# Patient Record
Sex: FEMALE | Race: WHITE | NOT HISPANIC OR LATINO | Employment: OTHER | URBAN - METROPOLITAN AREA
[De-identification: names, ages, dates, MRNs, and addresses within clinical notes are randomized per-mention and may not be internally consistent; named-entity substitution may affect disease eponyms.]

---

## 2012-06-04 LAB — PAP-ABSTRACT: NORMAL

## 2013-04-23 LAB — PAP-ABSTRACT: NORMAL

## 2014-12-08 LAB — PAP-ABSTRACT: NORMAL

## 2017-12-04 ENCOUNTER — OFFICE VISIT (OUTPATIENT)
Dept: OBGYN | Facility: CLINIC | Age: 67
End: 2017-12-04
Payer: COMMERCIAL

## 2017-12-04 ENCOUNTER — RADIANT APPOINTMENT (OUTPATIENT)
Dept: MAMMOGRAPHY | Facility: CLINIC | Age: 67
End: 2017-12-04
Payer: COMMERCIAL

## 2017-12-04 VITALS
DIASTOLIC BLOOD PRESSURE: 62 MMHG | BODY MASS INDEX: 24.66 KG/M2 | SYSTOLIC BLOOD PRESSURE: 112 MMHG | WEIGHT: 134 LBS | HEIGHT: 62 IN

## 2017-12-04 DIAGNOSIS — Z01.419 ENCOUNTER FOR GYNECOLOGICAL EXAMINATION WITHOUT ABNORMAL FINDING: Primary | ICD-10-CM

## 2017-12-04 DIAGNOSIS — Z12.31 VISIT FOR SCREENING MAMMOGRAM: ICD-10-CM

## 2017-12-04 DIAGNOSIS — N95.2 VAGINITIS, ATROPHIC: ICD-10-CM

## 2017-12-04 PROCEDURE — G0202 SCR MAMMO BI INCL CAD: HCPCS | Mod: TC

## 2017-12-04 PROCEDURE — 77063 BREAST TOMOSYNTHESIS BI: CPT | Mod: TC

## 2017-12-04 PROCEDURE — G0476 HPV COMBO ASSAY CA SCREEN: HCPCS | Performed by: OBSTETRICS & GYNECOLOGY

## 2017-12-04 PROCEDURE — 99397 PER PM REEVAL EST PAT 65+ YR: CPT | Performed by: OBSTETRICS & GYNECOLOGY

## 2017-12-04 PROCEDURE — G0145 SCR C/V CYTO,THINLAYER,RESCR: HCPCS | Performed by: OBSTETRICS & GYNECOLOGY

## 2017-12-04 RX ORDER — CONJUGATED ESTROGENS 0.62 MG/G
0.5 CREAM VAGINAL
Qty: 30 G | Refills: 3 | Status: SHIPPED | OUTPATIENT
Start: 2017-12-04 | End: 2018-02-19

## 2017-12-04 ASSESSMENT — ANXIETY QUESTIONNAIRES
1. FEELING NERVOUS, ANXIOUS, OR ON EDGE: NOT AT ALL
IF YOU CHECKED OFF ANY PROBLEMS ON THIS QUESTIONNAIRE, HOW DIFFICULT HAVE THESE PROBLEMS MADE IT FOR YOU TO DO YOUR WORK, TAKE CARE OF THINGS AT HOME, OR GET ALONG WITH OTHER PEOPLE: NOT DIFFICULT AT ALL
3. WORRYING TOO MUCH ABOUT DIFFERENT THINGS: NOT AT ALL
7. FEELING AFRAID AS IF SOMETHING AWFUL MIGHT HAPPEN: NOT AT ALL
6. BECOMING EASILY ANNOYED OR IRRITABLE: NOT AT ALL
2. NOT BEING ABLE TO STOP OR CONTROL WORRYING: NOT AT ALL
5. BEING SO RESTLESS THAT IT IS HARD TO SIT STILL: NOT AT ALL
GAD7 TOTAL SCORE: 0

## 2017-12-04 ASSESSMENT — PATIENT HEALTH QUESTIONNAIRE - PHQ9
5. POOR APPETITE OR OVEREATING: NOT AT ALL
SUM OF ALL RESPONSES TO PHQ QUESTIONS 1-9: 0

## 2017-12-04 NOTE — PROGRESS NOTES
Maribel is a 67 year old No obstetric history on file. female who presents for annual exam.     Besides routine health maintenance,  she would like to discuss premarin cream.    Do you have a Health Care Directive?: Yes: Patient states has Advance Directive and will bring in a copy to clinic.    Fall risk:   Fall Risk Assessment completed per order.    HPI: The patient is seen at this time for her annual examination. She uses Premarin cream for vaginal atrophy and has had no side effects. Her maintenance is 2 times a week.  The patient's PCP is Chelsea Saini MD.        GYNECOLOGIC HISTORY:  No LMP recorded. Patient is postmenopausal..   reports that she has never smoked. She does not have any smokeless tobacco history on file.      Patient is sexually active.  STD testing offered?  Declined  Last PHQ-9 score on record= PHQ-9 SCORE 11/30/2016   Total Score 0     Last GAD7 score on record=   CHENG-7 SCORE 11/30/2016   Total Score 0     Alcohol Score = 4    HEALTH MAINTENANCE:  Cholesterol: (No results found for: CHOL   Last Mammo: one year ago, Result: normal, Next Mammo: today   Pap:   Lab Results   Component Value Date    PAP NIL 11/30/2016    PAP NIL 12/23/2015 11/30/2016 WNL   DEXA:  11/30/16  Colonoscopy:  <5 years ago, Result:  normal, Next Colonoscopy: repeats them every 5 years.    Health maintenance updated:  yes    HISTORY:  Obstetric History     No data available        There is no problem list on file for this patient.    Past Surgical History:   Procedure Laterality Date     AS ENDOMETRIAL BIOPSY W/O CERVICAL DILATION  2000     BACK SURGERY  1997    Lumbar discs     COLONOSCOPY  2014     DILATION AND CURETTAGE, OPERATIVE HYSTEROSCOPY, COMBINED  2002      Social History   Substance Use Topics     Smoking status: Never Smoker     Smokeless tobacco: Not on file     Alcohol use Yes      Problem (# of Occurrences) Relation (Name,Age of Onset)    Other Cancer (2) Mother: ovarian, Paternal Aunt: uterine             Current Outpatient Prescriptions   Medication Sig     lisinopril (PRINIVIL/ZESTRIL) 40 MG tablet Take 40 mg by mouth daily     PREMARIN cream Place 0.5 g vaginally twice a week     conjugated estrogens (PREMARIN) cream Place 0.5 g vaginally twice a week     simvastatin (ZOCOR) 20 MG tablet      No current facility-administered medications for this visit.        No Known Allergies    Past medical, surgical, social and family history were reviewed and updated in EPIC.    ROS:   12 point review of systems negative other than symptoms noted below.    EXAM:  There were no vitals taken for this visit.   BMI: There is no height or weight on file to calculate BMI.    EXAM:  Constitutional: Appearance: Well nourished, well developed alert, in no acute distress  Neck:  Lymph Nodes:  No lymphadenopathy present    Thyroid:  Gland size normal, nontender, no nodules or masses present  on palpation  Chest:  Respiratory Effort:  Breathing unlabored  Cardiovascular:Heart    Auscultation:  Regular rate, normal rhythm, no murmurs present  Breasts: Inspection of Breasts:  No lymphadenopathy present., Palpation of Breasts and Axillae:  No masses present on palpation, no breast tenderness., Axillary Lymph Nodes:  No lymphadenopathy present. and No nodularity, asymmetry or nipple discharge bilaterally.  Gastrointestinal:  Abdominal Examination:  Abdomen nontender to palpation, tone normal without     rigidity or guarding, no masses present, umbilicus without lesions    Liver and speen:  No hepatomegaly present, liver nontender to palpation    Hernias:  No hernias present  Lymphatic: Lymph Nodes:  No other lymphadenopathy present  Skin:  General Inspection:  No rashes present, no lesions present, no areas of  discoloration.    Genitalia and Groin:  No rashes present, no lesions present, no areas of  discoloration, no masses present  Neurologic/Psychiatric:    Mental Status:  Oriented X3     Pelvic Exam:  External Genitalia:     Normal  appearance for age, no discharge present, no tenderness present, no inflammatory lesions present, color normal  Vagina:     Normal vaginal vault without central or paravaginal defects, no discharge present, no inflammatory lesions present, no masses present  Bladder:     Nontender to palpation  Urethra:   Urethral Body:  Urethra palpation normal, urethra structural support normal   Urethral Meatus:  No erythema or lesions present  Cervix:     Appearance healthy, no lesions present, nontender to palpation, no bleeding present  Uterus:     Uterus: firm, normal sized and nontender, midplane in position.   Adnexa:     No adnexal tenderness present, no adnexal masses present  Perineum:     Perineum within normal limits, no evidence of trauma, no rashes or skin lesions present  Anus:     Anus within normal limits, no hemorrhoids present  Inguinal Lymph Nodes:     No lymphadenopathy present  Pubic Hair:     Normal pubic hair distribution for age  Genitalia and Groin:     No rashes present, no lesions present, no areas of discoloration, no masses present      COUNSELING:   Reviewed preventive health counseling, as reflected in patient instructions       Regular exercise       Healthy diet/nutrition    BMI:  There is no height or weight on file to calculate BMI.     reports that she has never smoked. She does not have any smokeless tobacco history on file.        ASSESSMENT:  67 year old female with satisfactory annual exam.    PLAN: The patient will continue to use her Premarin cream that we will refill. We will convey her results and asked her to stay with her yearly preventative exams.      Cody Cochran MD

## 2017-12-04 NOTE — LETTER
December 12, 2017      Maribel Aguilar  [address] [facility] [address]    Dear ,      I am happy to inform you that your cervical cancer screening test (PAP smear) was normal and your Human Papillomavirus (HPV) test was negative.    Per current guidelines, you no longer need to have pap smears completed. Please return for annual pelvic exams.    Please continue to be seen every year for an annual wellness visit and other preventative tests.     Please contact my office at [phone] if you have further questions.    Sincerely,      Cody Cochran MD/keke

## 2017-12-04 NOTE — MR AVS SNAPSHOT
"              After Visit Summary   2017    Maribel Aguilar    MRN: [id number]           Patient Information     Date Of Birth          1950        Visit Information        Provider Department      2017 10:45 AM Cody Cochran MD [facility]        Today's Diagnoses     Encounter for gynecological examination without abnormal finding    -  1    Vaginitis, atrophic           Follow-ups after your visit        Who to contact     If you have questions or need follow up information about today's clinic visit or your schedule please contact [facility] directly at [phone].  Normal or non-critical lab and imaging results will be communicated to you by MyChart, letter or phone within 4 business days after the clinic has received the results. If you do not hear from us within 7 days, please contact the clinic through [employer] or phone. If you have a critical or abnormal lab result, we will notify you by phone as soon as possible.  Submit refill requests through [employer] or call your pharmacy and they will forward the refill request to us. Please allow 3 business days for your refill to be completed.          Additional Information About Your Visit        MyChart Information     [employer] lets you send messages to your doctor, view your test results, renew your prescriptions, schedule appointments and more. To sign up, go to [email or web address] . Click on \"Log in\" on the left side of the screen, which will take you to the Welcome page. Then click on \"Sign up Now\" on the right side of the page.     You will be asked to enter the access code listed below, as well as some personal information. Please follow the directions to create your username and password.     Your access code is: [email or web address]  Expires: 3/4/2018 11:40 AM     Your access code will  in 90 days. If you need help or a new code, please call your [place] clinic or [phone].        Care " "EveryWhere ID     This is your Care EveryWhere ID. This could be used by other organizations to access your [place] medical records  [id number]        Your Vitals Were     Height Breastfeeding? BMI (Body Mass Index)             5' 2\" (1.575 m) No 24.51 kg/m2          Blood Pressure from Last 3 Encounters:   12/04/17 112/62   11/30/16 124/60   12/23/15 134/60    Weight from Last 3 Encounters:   12/04/17 134 lb (60.8 kg)   11/30/16 134 lb 12.8 oz (61.1 kg)   12/23/15 131 lb (59.4 kg)              We Performed the Following     HPV High Risk Types DNA Cervical     Pap imaged thin layer screen with HPV - recommended age 30 - 65          Where to get your medicines      These medications were sent to [facility] [id number], [place] [address] [address] [facility]  [address]  [facility] [address]     Phone:  [phone]     PREMARIN cream          Primary Care Provider Office Phone # Fax #    Chelsea Saini [phone]       [facility] [address]  [facility] [address]        Equal Access to Services     ABDI LUKE AH: Hadii aad ku hadasho Soomaali, waaxda luqadaha, qaybta kaalmada adeegyada, waxay idiin hayradhan jenniffer morgan lage han. So [facility] [phone].    ATENCIÓN: Si habla español, tiene a hudson disposición servicios gratuitos de asistencia lingüística. Llame al [phone].    We comply with applicable federal civil rights laws and [place] laws. We do not discriminate on the basis of race, color, national origin, age, disability, sex, sexual orientation, or gender identity.            Thank you!     Thank you for choosing [facility] [address]  for your care. Our goal is always to provide you with excellent care. Hearing back from our patients is one way we can continue to improve our services. Please take a few minutes to complete the written survey that you may receive in the mail after your visit with us. Thank you!             Your " Updated Medication List - Protect others around you: Learn how to safely use, store and throw away your medicines at www.disposemymeds.org.          This list is accurate as of: 12/4/17 11:40 AM.  Always use your most recent med list.                   Brand Name Dispense Instructions for use Diagnosis    lisinopril 40 MG tablet    PRINIVIL/ZESTRIL     Take 40 mg by mouth daily        PREMARIN cream   Generic drug:  conjugated estrogens     30 g    Place 0.5 g vaginally twice a week    Vaginitis, atrophic       simvastatin 20 MG tablet    ZOCOR

## 2017-12-05 ENCOUNTER — TELEPHONE (OUTPATIENT)
Dept: OBGYN | Facility: CLINIC | Age: 67
End: 2017-12-05

## 2017-12-05 ASSESSMENT — ANXIETY QUESTIONNAIRES: GAD7 TOTAL SCORE: 0

## 2017-12-05 NOTE — TELEPHONE ENCOUNTER
rec'd rejection for premarin vaginal cream. LM on PHI for her to check with insurance on form alts, or she can continue to pay cash as she has been per notes in chart.

## 2017-12-06 LAB
COPATH REPORT: NORMAL
PAP: NORMAL

## 2017-12-06 NOTE — TELEPHONE ENCOUNTER
Reason for Call:  Other call back    Detailed comments: Patient wants to tell Leslie that she has decided to stick with using the Premarin Creme for now. But, she is not going to fill it until she needs it. So she hopes it is not going to be  a problem for her to refill when she wants to.    Phone Number Patient can be reached at: Cell number on file:    Telephone Information:   Mobile [phone]       Best Time: today    Can we leave a detailed message on this number? YES    Call taken on 12/6/2017 at 3:49 PM by Ginger Burt

## 2017-12-08 LAB
FINAL DIAGNOSIS: NORMAL
HPV HR 12 DNA CVX QL NAA+PROBE: NEGATIVE
HPV16 DNA SPEC QL NAA+PROBE: NEGATIVE
HPV18 DNA SPEC QL NAA+PROBE: NEGATIVE
SPECIMEN DESCRIPTION: NORMAL

## 2017-12-11 PROBLEM — Z12.4 CERVICAL CANCER SCREENING: Status: RESOLVED | Noted: 2017-12-11 | Resolved: 2017-12-11

## 2018-02-19 ENCOUNTER — TELEPHONE (OUTPATIENT)
Dept: OBGYN | Facility: CLINIC | Age: 68
End: 2018-02-19

## 2018-02-19 DIAGNOSIS — N95.2 VAGINITIS, ATROPHIC: ICD-10-CM

## 2018-02-19 RX ORDER — HYDROCORTISONE 2.5 %
CREAM (GRAM) TOPICAL
Qty: 30 G | Refills: 0 | OUTPATIENT
Start: 2018-02-19

## 2018-02-19 NOTE — TELEPHONE ENCOUNTER
[facility] stated they never received the Rx that was sent to them 12/4/17.  Pt would like a paper Rx to send to [facility] SO SHE CAN GET HER CREAM FOR A MORE REASONABLE COST.   Pt states she will come in to pick it up.   Routing to Roxy Jang- ok to refill for pt- she's requesting the paper copy.

## 2018-02-19 NOTE — TELEPHONE ENCOUNTER
Pt saw dr. Cochran in dec and he said he would put in a refill for her premerine cream and when she went to refill it there was no rx there for her.  She also wants to get her medication refill in [place] where it is cheaper and she would like to talk to a nurse before anything is given to her.  She needs a different dosage and she needs a written rx for it.  Pt was told her medication is from [facility] and she wants to know that it is safe.

## 2018-02-20 RX ORDER — CONJUGATED ESTROGENS 0.62 MG/G
1 CREAM VAGINAL
Qty: 42 G | Refills: 3 | Status: SHIPPED | OUTPATIENT
Start: 2018-02-21 | End: 2018-12-06

## 2018-02-20 RX ORDER — CONJUGATED ESTROGENS 0.62 MG/G
1 CREAM VAGINAL
Qty: 30 G | Refills: 3 | Status: SHIPPED | OUTPATIENT
Start: 2018-02-21 | End: 2018-02-20

## 2018-12-06 ENCOUNTER — RADIANT APPOINTMENT (OUTPATIENT)
Dept: MAMMOGRAPHY | Facility: CLINIC | Age: 68
End: 2018-12-06
Payer: COMMERCIAL

## 2018-12-06 ENCOUNTER — OFFICE VISIT (OUTPATIENT)
Dept: OBGYN | Facility: CLINIC | Age: 68
End: 2018-12-06
Payer: COMMERCIAL

## 2018-12-06 ENCOUNTER — RADIANT APPOINTMENT (OUTPATIENT)
Dept: BONE DENSITY | Facility: CLINIC | Age: 68
End: 2018-12-06
Payer: COMMERCIAL

## 2018-12-06 VITALS
WEIGHT: 128.2 LBS | DIASTOLIC BLOOD PRESSURE: 62 MMHG | SYSTOLIC BLOOD PRESSURE: 106 MMHG | HEART RATE: 74 BPM | BODY MASS INDEX: 23.59 KG/M2 | HEIGHT: 62 IN

## 2018-12-06 DIAGNOSIS — Z78.0 ASYMPTOMATIC POSTMENOPAUSAL STATE: ICD-10-CM

## 2018-12-06 DIAGNOSIS — Z01.419 ENCOUNTER FOR GYNECOLOGICAL EXAMINATION WITHOUT ABNORMAL FINDING: Primary | ICD-10-CM

## 2018-12-06 DIAGNOSIS — Z12.31 VISIT FOR SCREENING MAMMOGRAM: ICD-10-CM

## 2018-12-06 DIAGNOSIS — Z13.820 SCREENING FOR OSTEOPOROSIS: Primary | ICD-10-CM

## 2018-12-06 DIAGNOSIS — M85.80 OSTEOPENIA, SENILE: ICD-10-CM

## 2018-12-06 DIAGNOSIS — N95.2 VAGINITIS, ATROPHIC: ICD-10-CM

## 2018-12-06 DIAGNOSIS — M89.9 DISORDER OF BONE: ICD-10-CM

## 2018-12-06 PROCEDURE — G0145 SCR C/V CYTO,THINLAYER,RESCR: HCPCS | Performed by: OBSTETRICS & GYNECOLOGY

## 2018-12-06 PROCEDURE — G0476 HPV COMBO ASSAY CA SCREEN: HCPCS | Performed by: OBSTETRICS & GYNECOLOGY

## 2018-12-06 PROCEDURE — 77067 SCR MAMMO BI INCL CAD: CPT | Mod: TC

## 2018-12-06 PROCEDURE — 77080 DXA BONE DENSITY AXIAL: CPT | Performed by: OBSTETRICS & GYNECOLOGY

## 2018-12-06 PROCEDURE — 77063 BREAST TOMOSYNTHESIS BI: CPT | Mod: TC

## 2018-12-06 PROCEDURE — 99397 PER PM REEVAL EST PAT 65+ YR: CPT | Performed by: OBSTETRICS & GYNECOLOGY

## 2018-12-06 RX ORDER — CONJUGATED ESTROGENS 0.62 MG/G
1 CREAM VAGINAL
Qty: 42 G | Refills: 3 | Status: SHIPPED | OUTPATIENT
Start: 2018-12-07 | End: 2019-12-09

## 2018-12-06 ASSESSMENT — ANXIETY QUESTIONNAIRES
2. NOT BEING ABLE TO STOP OR CONTROL WORRYING: NOT AT ALL
GAD7 TOTAL SCORE: 0
3. WORRYING TOO MUCH ABOUT DIFFERENT THINGS: NOT AT ALL
1. FEELING NERVOUS, ANXIOUS, OR ON EDGE: NOT AT ALL
IF YOU CHECKED OFF ANY PROBLEMS ON THIS QUESTIONNAIRE, HOW DIFFICULT HAVE THESE PROBLEMS MADE IT FOR YOU TO DO YOUR WORK, TAKE CARE OF THINGS AT HOME, OR GET ALONG WITH OTHER PEOPLE: NOT DIFFICULT AT ALL
5. BEING SO RESTLESS THAT IT IS HARD TO SIT STILL: NOT AT ALL
6. BECOMING EASILY ANNOYED OR IRRITABLE: NOT AT ALL
7. FEELING AFRAID AS IF SOMETHING AWFUL MIGHT HAPPEN: NOT AT ALL

## 2018-12-06 ASSESSMENT — PATIENT HEALTH QUESTIONNAIRE - PHQ9
5. POOR APPETITE OR OVEREATING: NOT AT ALL
SUM OF ALL RESPONSES TO PHQ QUESTIONS 1-9: 0

## 2018-12-06 NOTE — PROGRESS NOTES
Maribel is a 68 year old No obstetric history on file. female who presents for annual exam.     Besides routine health maintenance, she has no other health concerns today .    Do you have a Health Care Directive?: Yes: Patient states has Advance Directive and will bring in a copy to clinic.    Fall risk:   Fallen 2 or more times in the past year?: No  Any fall with injury in the past year?: No    HPI: The patient is seen at this time for her annual exam.  She is still using estrogen cream but inconsistently.  She has no current complaints.  The patient's PCP is  Chelsea Saini MD.      GYNECOLOGIC HISTORY:  No LMP recorded. Patient is postmenopausal..   reports that she has never smoked. She has never used smokeless tobacco.    Patient is sexually active.  STD testing offered?  Declined  Last PHQ-9 score on record=   PHQ-9 SCORE 12/6/2018   PHQ-9 Total Score 0     Last GAD7 score on record=   CHENG-7 SCORE 11/30/2016 12/4/2017 12/6/2018   Total Score 0 0 0     Alcohol Score = 4    HEALTH MAINTENANCE:  Cholesterol: 5/1/2017   Total= 184, Triglycerides=80, HDL=64, ZCG=324  Last Mammo: 12/5/2017, Result: normal, Next Mammo: today   Pap: 12/4/2017 Neg, HPV-  Lab Results   Component Value Date    PAP NIL 12/04/2017    PAP NIL 11/30/2016    PAP NIL 12/23/2015      DEXA:  12/5/2017  Colonoscopy:  11/3/2000, Result:  normal, Next Colonoscopy: 10 years.    Health maintenance updated:  yes    HISTORY:  Obstetric History     No data available        Patient Active Problem List   Diagnosis   (none) - all problems resolved or deleted     Past Surgical History:   Procedure Laterality Date     AS ENDOMETRIAL BIOPSY W/O CERVICAL DILATION  2000     BACK SURGERY  1997    Lumbar discs     COLONOSCOPY  2014     DILATION AND CURETTAGE, OPERATIVE HYSTEROSCOPY, COMBINED  2002      Social History   Substance Use Topics     Smoking status: Never Smoker     Smokeless tobacco: Never Used     Alcohol use Yes      Problem (# of Occurrences)  "Relation (Name,Age of Onset)    Other Cancer (2) Mother: ovarian, Paternal Aunt: uterine            Current Outpatient Prescriptions   Medication Sig     lisinopril (PRINIVIL/ZESTRIL) 40 MG tablet Take 40 mg by mouth daily     PREMARIN cream Place 1 g vaginally three times a week     simvastatin (ZOCOR) 20 MG tablet      No current facility-administered medications for this visit.        No Known Allergies    Past medical, surgical, social and family history were reviewed and updated in EPIC.    ROS:   12/5/2017    EXAM:  /62  Pulse 74  Ht 5' 2\" (1.575 m)  Wt 128 lb 3.2 oz (58.2 kg)  Breastfeeding? No  BMI 23.45 kg/m2   BMI: Body mass index is 23.45 kg/(m^2).    EXAM:  Constitutional: Appearance: Well nourished, well developed alert, in no acute distress  Neck:  Lymph Nodes:  No lymphadenopathy present    Thyroid:  Gland size normal, nontender, no nodules or masses present  on palpation  Chest:  Respiratory Effort:  Breathing unlabored  Cardiovascular:Heart    Auscultation:  Regular rate, normal rhythm, no murmurs present  Breasts: Inspection of Breasts:  No lymphadenopathy present., Palpation of Breasts and Axillae:  No masses present on palpation, no breast tenderness., Axillary Lymph Nodes:  No lymphadenopathy present. and No nodularity, asymmetry or nipple discharge bilaterally.  Gastrointestinal:  Abdominal Examination:  Abdomen nontender to palpation, tone normal without     rigidity or guarding, no masses present, umbilicus without lesions    Liver and speen:  No hepatomegaly present, liver nontender to palpation    Hernias:  No hernias present  Lymphatic: Lymph Nodes:  No other lymphadenopathy present  Skin:  General Inspection:  No rashes present, no lesions present, no areas of  discoloration.    Genitalia and Groin:  No rashes present, no lesions present, no areas of  discoloration, no masses present  Neurologic/Psychiatric:    Mental Status:  Oriented X3     Pelvic Exam:  External Genitalia:  "    Normal appearance for age, no discharge present, no tenderness present, no inflammatory lesions present, color normal  Vagina:     Normal vaginal vault without central or paravaginal defects, ATROPHIC  Bladder:     Nontender to palpation  Urethra:   Urethral Body:  Urethra palpation normal, urethra structural support normal   Urethral Meatus:  No erythema or lesions present  Cervix:     Appearance healthy, no lesions present, nontender to palpation, no bleeding present  Uterus:     Nontender to palpation, no masses present, position anteflexed, mobility: normal  Adnexa:     No adnexal tenderness present, no adnexal masses present  Perineum:     Perineum within normal limits, no evidence of trauma, no rashes or skin lesions present  Inguinal Lymph Nodes:     No lymphadenopathy present      COUNSELING:   Reviewed preventive health counseling, as reflected in patient instructions       Regular exercise       Healthy diet/nutrition    BMI:  Body mass index is 23.45 kg/(m^2).     reports that she has never smoked. She has never used smokeless tobacco.      ASSESSMENT:  68 year old female with satisfactory annual exam.    ICD-10-CM    1. Encounter for gynecological examination without abnormal finding Z01.419 Pap imaged thin layer screen with HPV - recommended age 30 - 65     HPV High Risk Types DNA Cervical   2. Vaginitis, atrophic N95.2 PREMARIN 0.625 MG/GM vaginal cream       PLAN: We will convey the patient's screening results when available.  We encouraged her to continue her calcium vitamin D and exercise      Cody Cochran MD

## 2018-12-06 NOTE — LETTER
December 17, 2018      Maribel Aguilar  [address] [facility] [address]    Dear ,      I am happy to inform you that your cervical cancer screening test (PAP smear) was normal and your Human Papillomavirus (HPV) test was negative.    Per current guidelines, you no longer need to have pap smears completed.     Please continue to be seen every year for an annual wellness visit and other preventative tests.     If you have additional questions regarding this result, please call our registered nurse, Betzy at [phone].    Sincerely,      Cody Cochran MD/keke

## 2018-12-06 NOTE — MR AVS SNAPSHOT
"              After Visit Summary   2018    Maribel Aguilar    MRN: [id number]           Patient Information     Date Of Birth          1950        Visit Information        Provider Department      2018 2:00 PM Cody Cochran MD [facility]        Today's Diagnoses     Encounter for gynecological examination without abnormal finding    -  1    Vaginitis, atrophic           Follow-ups after your visit        Who to contact     If you have questions or need follow up information about today's clinic visit or your schedule please contact [facility] directly at [phone].  Normal or non-critical lab and imaging results will be communicated to you by MyChart, letter or phone within 4 business days after the clinic has received the results. If you do not hear from us within 7 days, please contact the clinic through [employer] or phone. If you have a critical or abnormal lab result, we will notify you by phone as soon as possible.  Submit refill requests through [employer] or call your pharmacy and they will forward the refill request to us. Please allow 3 business days for your refill to be completed.          Additional Information About Your Visit        MyChart Information     [employer] lets you send messages to your doctor, view your test results, renew your prescriptions, schedule appointments and more. To sign up, go to [email or web address] . Click on \"Log in\" on the left side of the screen, which will take you to the Welcome page. Then click on \"Sign up Now\" on the right side of the page.     You will be asked to enter the access code listed below, as well as some personal information. Please follow the directions to create your username and password.     Your access code is: [email or web address]  Expires: 3/6/2019 12:56 PM     Your access code will  in 90 days. If you need help or a new code, please call your [place] clinic or [phone].        Care " "EveryWhere ID     This is your Care EveryWhere ID. This could be used by other organizations to access your [place] medical records  [id number]        Your Vitals Were     Pulse Height Breastfeeding? BMI (Body Mass Index)          74 5' 2\" (1.575 m) No 23.45 kg/m2         Blood Pressure from Last 3 Encounters:   12/06/18 106/62   12/04/17 112/62   11/30/16 124/60    Weight from Last 3 Encounters:   12/06/18 128 lb 3.2 oz (58.2 kg)   12/04/17 134 lb (60.8 kg)   11/30/16 134 lb 12.8 oz (61.1 kg)              We Performed the Following     HPV High Risk Types DNA Cervical     Pap imaged thin layer screen with HPV - recommended age 30 - 65          Where to get your medicines      Some of these will need a paper prescription and others can be bought over the counter.  Ask your nurse if you have questions.     Bring a paper prescription for each of these medications     PREMARIN 0.625 MG/GM vaginal cream          Primary Care Provider Office Phone # Fax #    Chelsea Saini [phone]       [facility] [address]  [facility] [address]        Equal Access to Services     ABDI LUKE AH: Hadii poppy dooleyo Soshaguftaali, waaxda luqadaha, qaybta kaalmada adeegyada, alissa han. So [facility] [phone].    ATENCIÓN: Si habla español, tiene a hudson disposición servicios gratuitos de asistencia lingüística. Llame al [phone].    We comply with applicable federal civil rights laws and [place] laws. We do not discriminate on the basis of race, color, national origin, age, disability, sex, sexual orientation, or gender identity.            Thank you!     Thank you for choosing [facility] [address]  for your care. Our goal is always to provide you with excellent care. Hearing back from our patients is one way we can continue to improve our services. Please take a few minutes to complete the written survey that you may receive in the mail after your visit " with us. Thank you!             Your Updated Medication List - Protect others around you: Learn how to safely use, store and throw away your medicines at www.disposemymeds.org.          This list is accurate as of 12/6/18  2:20 PM.  Always use your most recent med list.                   Brand Name Dispense Instructions for use Diagnosis    lisinopril 40 MG tablet    PRINIVIL/ZESTRIL     Take 40 mg by mouth daily        PREMARIN 0.625 MG/GM vaginal cream   Generic drug:  conjugated estrogens   Start taking on:  12/7/2018     42 g    Place 1 g vaginally three times a week    Vaginitis, atrophic       simvastatin 20 MG tablet    ZOCOR

## 2018-12-07 ASSESSMENT — ANXIETY QUESTIONNAIRES: GAD7 TOTAL SCORE: 0

## 2018-12-10 LAB
COPATH REPORT: NORMAL
PAP: NORMAL

## 2018-12-12 LAB
FINAL DIAGNOSIS: NORMAL
HPV HR 12 DNA CVX QL NAA+PROBE: NEGATIVE
HPV16 DNA SPEC QL NAA+PROBE: NEGATIVE
HPV18 DNA SPEC QL NAA+PROBE: NEGATIVE
SPECIMEN DESCRIPTION: NORMAL
SPECIMEN SOURCE CVX/VAG CYTO: NORMAL

## 2019-01-02 ENCOUNTER — TELEPHONE (OUTPATIENT)
Dept: OBGYN | Facility: CLINIC | Age: 69
End: 2019-01-02

## 2019-01-02 NOTE — TELEPHONE ENCOUNTER
Pt calling needing to switch her rx from [facility]  Pt has an alternative pharmacy in [place] she could try. Advised pt to call and see if her current rx can be sent there or if she needs a new one.   Pt will call back if she needs to  a new rx.  Nette Still RN on 1/2/2019 at 12:01 PM

## 2019-01-11 ENCOUNTER — TRANSFERRED RECORDS (OUTPATIENT)
Dept: HEALTH INFORMATION MANAGEMENT | Facility: CLINIC | Age: 69
End: 2019-01-11

## 2019-12-05 NOTE — PROGRESS NOTES
Maribel is a 69 year old No obstetric history on file. female who presents for annual exam.     Besides routine health maintenance, she has no other health concerns today .    Do you have a Health Care Directive?: Yes, advance care planning is on file.    Fall risk:   Fallen 2 or more times in the past year?: No  Any fall with injury in the past year?: Yes    HPI: The patient is seen at this time for her annual exam.  She has had one abnormal Pap smear in her past that is had normals for years.  We did discuss the ongoing screening protocols and she could start spacing out her screening intervals at this time.  Mammogram is planned for today.  Her review of systems is otherwise negative.  The patient's PCP is  Chelsea Saini MD.      GYNECOLOGIC HISTORY:  No LMP recorded. Patient is postmenopausal..   reports that she has never smoked. She has never used smokeless tobacco.    Patient is sexually active.  STD testing offered?  Declined  Last PHQ-9 score on record=   PHQ-9 SCORE 12/9/2019   PHQ-9 Total Score 0     Last GAD7 score on record=   CHENG-7 SCORE 12/4/2017 12/6/2018 12/9/2019   Total Score 0 0 0     Alcohol Score = 4    HEALTH MAINTENANCE:   Cholesterol: with PCP   Last Mammo: 12/6/18, Result: Normal, Next Mammo: Today   Pap:   Lab Results   Component Value Date    PAP NIL HPV- 12/06/2018    PAP NIL 12/04/2017    PAP NIL 11/30/2016      DEXA:  12/6/18  Colonoscopy:  1/11/19, Result: polyp, Next Colonoscopy: 5 years.    Health maintenance updated:  yes    HISTORY:  OB History   No obstetric history on file.     Patient Active Problem List   Diagnosis   (none) - all problems resolved or deleted     Past Surgical History:   Procedure Laterality Date     AS ENDOMETRIAL BIOPSY W/O CERVICAL DILATION  2000     BACK SURGERY  1997    Lumbar discs     COLONOSCOPY  2014     DILATION AND CURETTAGE, OPERATIVE HYSTEROSCOPY, COMBINED  2002      Social History     Tobacco Use     Smoking status: Never Smoker     Smokeless  "tobacco: Never Used   Substance Use Topics     Alcohol use: Yes      Problem (# of Occurrences) Relation (Name,Age of Onset)    Other Cancer (2) Mother: ovarian, Paternal Aunt: uterine            Current Outpatient Medications   Medication Sig     lisinopril (PRINIVIL/ZESTRIL) 40 MG tablet Take 40 mg by mouth daily     PREMARIN 0.625 MG/GM vaginal cream Place 1 g vaginally three times a week     simvastatin (ZOCOR) 20 MG tablet      No current facility-administered medications for this visit.        No Known Allergies    Past medical, surgical, social and family history were reviewed and updated in EPIC.    ROS:   12 point review of systems negative other than symptoms noted below or in the HPI.  No urinary frequency or dysuria, bladder or kidney problems    EXAM:  /58   Pulse 72   Ht 1.575 m (5' 2\")   Wt 60.3 kg (133 lb)   BMI 24.33 kg/m     BMI: Body mass index is 24.33 kg/m .    EXAM:  Constitutional: Appearance: Well nourished, well developed alert, in no acute distress  Neck:  Lymph Nodes:  No lymphadenopathy present    Thyroid:  Gland size normal, nontender, no nodules or masses present  on palpation  Chest:  Respiratory Effort:  Breathing unlabored  Cardiovascular:Heart    Auscultation:  Regular rate, normal rhythm, no murmurs present  Breasts: Inspection of Breasts:  No lymphadenopathy present., Palpation of Breasts and Axillae:  No masses present on palpation, no breast tenderness., Axillary Lymph Nodes:  No lymphadenopathy present. and No nodularity, asymmetry or nipple discharge bilaterally.  Gastrointestinal:  Abdominal Examination:  Abdomen nontender to palpation, tone normal without     rigidity or guarding, no masses present, umbilicus without lesions    Liver and speen:  No hepatomegaly present, liver nontender to palpation    Hernias:  No hernias present  Lymphatic: Lymph Nodes:  No other lymphadenopathy present  Skin:  General Inspection:  No rashes present, no lesions present, no areas " of  discoloration.    Genitalia and Groin:  No rashes present, no lesions present, no areas of  discoloration, no masses present  Neurologic/Psychiatric:    Mental Status:  Oriented X3     Pelvic Exam:  External Genitalia:     Normal appearance for age, no discharge present, no tenderness present, no inflammatory lesions present, color normal  Vagina:     Normal vaginal vault without central or paravaginal defects, ATROPHIC  Bladder:     Nontender to palpation  Urethra:   Urethral Body:  Urethra palpation normal, urethra structural support normal   Urethral Meatus:  No erythema or lesions present  Cervix:     Appearance healthy, no lesions present, nontender to palpation, no bleeding present  Uterus:     Nontender to palpation, no masses present, position anteflexed, mobility: normal  Adnexa:     No adnexal tenderness present, no adnexal masses present  Perineum:     Perineum within normal limits, no evidence of trauma, no rashes or skin lesions present  Inguinal Lymph Nodes:     No lymphadenopathy present      COUNSELING:   Reviewed preventive health counseling, as reflected in patient instructions       Regular exercise       Healthy diet/nutrition    BMI:  Body mass index is 24.33 kg/m .     reports that she has never smoked. She has never used smokeless tobacco.      ASSESSMENT:  69 year old female with satisfactory annual exam.    ICD-10-CM    1. Encounter for gynecological examination without abnormal finding Z01.419 Pap imaged thin layer screen with HPV - recommended age 30 - 65     HPV High Risk Types DNA Cervical   2. Vaginitis, atrophic N95.2        PLAN: We will contact the patient with her screening results.  She needs to stay on her estrogen replacement and will explore generic alternatives for Premarin.      Cody Cochran MD

## 2019-12-09 ENCOUNTER — ANCILLARY PROCEDURE (OUTPATIENT)
Dept: MAMMOGRAPHY | Facility: CLINIC | Age: 69
End: 2019-12-09
Payer: MEDICARE

## 2019-12-09 ENCOUNTER — OFFICE VISIT (OUTPATIENT)
Dept: OBGYN | Facility: CLINIC | Age: 69
End: 2019-12-09
Payer: MEDICARE

## 2019-12-09 VITALS
SYSTOLIC BLOOD PRESSURE: 112 MMHG | BODY MASS INDEX: 24.48 KG/M2 | DIASTOLIC BLOOD PRESSURE: 58 MMHG | HEIGHT: 62 IN | HEART RATE: 72 BPM | WEIGHT: 133 LBS

## 2019-12-09 DIAGNOSIS — Z12.31 VISIT FOR SCREENING MAMMOGRAM: ICD-10-CM

## 2019-12-09 DIAGNOSIS — Z01.419 ENCOUNTER FOR GYNECOLOGICAL EXAMINATION WITHOUT ABNORMAL FINDING: Primary | ICD-10-CM

## 2019-12-09 DIAGNOSIS — N95.2 VAGINITIS, ATROPHIC: ICD-10-CM

## 2019-12-09 PROCEDURE — 99397 PER PM REEVAL EST PAT 65+ YR: CPT | Performed by: OBSTETRICS & GYNECOLOGY

## 2019-12-09 PROCEDURE — 77063 BREAST TOMOSYNTHESIS BI: CPT | Mod: TC

## 2019-12-09 PROCEDURE — 77067 SCR MAMMO BI INCL CAD: CPT | Mod: TC

## 2019-12-09 PROCEDURE — 87624 HPV HI-RISK TYP POOLED RSLT: CPT | Performed by: OBSTETRICS & GYNECOLOGY

## 2019-12-09 PROCEDURE — G0145 SCR C/V CYTO,THINLAYER,RESCR: HCPCS | Performed by: OBSTETRICS & GYNECOLOGY

## 2019-12-09 RX ORDER — CONJUGATED ESTROGENS 0.62 MG/G
1 CREAM VAGINAL
Qty: 42 G | Refills: 3 | Status: SHIPPED | OUTPATIENT
Start: 2019-12-09 | End: 2020-12-10

## 2019-12-09 ASSESSMENT — ANXIETY QUESTIONNAIRES
5. BEING SO RESTLESS THAT IT IS HARD TO SIT STILL: NOT AT ALL
7. FEELING AFRAID AS IF SOMETHING AWFUL MIGHT HAPPEN: NOT AT ALL
GAD7 TOTAL SCORE: 0
6. BECOMING EASILY ANNOYED OR IRRITABLE: NOT AT ALL
1. FEELING NERVOUS, ANXIOUS, OR ON EDGE: NOT AT ALL
3. WORRYING TOO MUCH ABOUT DIFFERENT THINGS: NOT AT ALL
2. NOT BEING ABLE TO STOP OR CONTROL WORRYING: NOT AT ALL

## 2019-12-09 ASSESSMENT — PATIENT HEALTH QUESTIONNAIRE - PHQ9
5. POOR APPETITE OR OVEREATING: NOT AT ALL
SUM OF ALL RESPONSES TO PHQ QUESTIONS 1-9: 0

## 2019-12-09 ASSESSMENT — MIFFLIN-ST. JEOR: SCORE: 1081.53

## 2019-12-09 NOTE — LETTER
December 13, 2019    Maribel Aguilar  [address] [facility] [address]    Dear ,  This letter is regarding your recent Pap smear (cervical cancer screening) and Human Papillomavirus (HPV) test.  We are happy to inform you that your Pap smear result is normal. Cervical cancer is closely linked with certain types of HPV. Your results showed no evidence of high-risk HPV.  You will still need to return to the clinic every year for an annual exam and other preventive tests.  If you have additional questions regarding this result, please call our registered nurse, Betzy at [phone].  Sincerely,    Cody Cochran MD/keke

## 2019-12-10 ENCOUNTER — TELEPHONE (OUTPATIENT)
Dept: OBGYN | Facility: CLINIC | Age: 69
End: 2019-12-10

## 2019-12-10 DIAGNOSIS — N95.2 VAGINAL ATROPHY: Primary | ICD-10-CM

## 2019-12-10 RX ORDER — ESTRADIOL 0.1 MG/G
1 CREAM VAGINAL
Qty: 42.5 G | Refills: 6 | Status: SHIPPED | OUTPATIENT
Start: 2019-12-11 | End: 2019-12-11

## 2019-12-10 ASSESSMENT — ANXIETY QUESTIONNAIRES: GAD7 TOTAL SCORE: 0

## 2019-12-10 NOTE — TELEPHONE ENCOUNTER
Please call patient back to discuss - premarin prescription she was given by dr. Cochran yesterday.

## 2019-12-10 NOTE — TELEPHONE ENCOUNTER
From 12/9/19  PLAN: We will contact the patient with her screening results.  She needs to stay on her estrogen replacement and will explore generic alternatives for Premarin.  She took her written rx for permarin to her pharmacy and was told that they do not have a generic.  However, they did mention estradiol/Estrace generic and patient is wondering if this is the medication that Dr. Cochran was talking about.  Routing to provider to advise.  Diane Banks RN on 12/10/2019 at 3:30 PM

## 2019-12-10 NOTE — TELEPHONE ENCOUNTER
Patient informed. Pt verbalized understanding, in agreement with plan, and voiced no further questions.  Diane Banks RN on 12/10/2019 at 3:39 PM

## 2019-12-10 NOTE — TELEPHONE ENCOUNTER
Correct. Premarin is not generic. Estrace is. I sent that prescription to her [facility] just now.

## 2019-12-11 LAB
COPATH REPORT: NORMAL
PAP: NORMAL

## 2019-12-11 RX ORDER — ESTRADIOL 0.1 MG/G
1 CREAM VAGINAL
Qty: 42.5 G | Refills: 6 | Status: SHIPPED | OUTPATIENT
Start: 2019-12-11 | End: 2020-12-10

## 2019-12-11 NOTE — TELEPHONE ENCOUNTER
rx had been sent to wrong pharmacy.  Reordered and sent to pt. Preferred pharmacy [facility] [address] in [place]

## 2019-12-11 NOTE — TELEPHONE ENCOUNTER
Patient is calling today because she went to the pharmacy and they do not have the script Diane called in yesterday. Please call patient.

## 2019-12-12 NOTE — TELEPHONE ENCOUNTER
Refill request for alternative medication for premarin received. New rx has already been sent  Nette Still RN on 12/12/2019 at 9:19 AM

## 2020-12-09 NOTE — PROGRESS NOTES
Maribel is a 70 year old No obstetric history on file. female who presents for Medicare Limited exam.     Do you have a Health Care Directive?: Yes, patient states has an Advance Care Planning document and will bring a copy to the clinic.    Fall risk:   Fallen 2 or more times in the past year?: No  Any fall with injury in the past year?: No    HPI : Patient seen at this time for annual exam.  She is postmenopausal.  She is still using her estrogen vaginal cream successfully.      GYNECOLOGIC HISTORY:  No LMP recorded. Patient is postmenopausal..   reports that she has never smoked. She has never used smokeless tobacco.      Last PHQ-9 score on record=   PHQ-9 SCORE 12/10/2020   PHQ-9 Total Score 0     Last GAD7 score on record=   CHENG-7 SCORE 12/6/2018 12/9/2019 12/10/2020   Total Score 0 0 0       HEALTH MAINTENANCE:  Cholesterol: with PCP   Last Mammo: One year ago, Result: Normal, Next Mammo: Today   Pap:   Lab Results   Component Value Date    PAP NIL HPV- 12/09/2019    PAP NIL 12/06/2018    PAP NIL 12/04/2017      DEXA:  today  Colonoscopy:  1/11/19, Result:  polyp, Next Colonoscopy: 2024.    HISTORY:  OB History   No obstetric history on file.     Past Medical History:   Diagnosis Date     HTN (hypertension)      Lumbar disc disease      Past Surgical History:   Procedure Laterality Date     AS ENDOMETRIAL BIOPSY W/O CERVICAL DILATION  2000     BACK SURGERY  1997    Lumbar discs     COLONOSCOPY  2014     DILATION AND CURETTAGE, OPERATIVE HYSTEROSCOPY, COMBINED  2002     Family History   Problem Relation Age of Onset     Other Cancer Mother         ovarian     Other Cancer Paternal Aunt         uterine     Social History     Socioeconomic History     Marital status:      Spouse name: Not on file     Number of children: Not on file     Years of education: Not on file     Highest education level: Not on file   Occupational History     Employer: RETIRED   Social Needs     Financial resource strain: Not on  "file     Food insecurity     Worry: Not on file     Inability: Not on file     Transportation needs     Medical: Not on file     Non-medical: Not on file   Tobacco Use     Smoking status: Never Smoker     Smokeless tobacco: Never Used   Substance and Sexual Activity     Alcohol use: Yes     Drug use: No     Sexual activity: Yes     Partners: Male     Birth control/protection: Post-menopausal   Lifestyle     Physical activity     Days per week: Not on file     Minutes per session: Not on file     Stress: Not on file   Relationships     Social connections     Talks on phone: Not on file     Gets together: Not on file     Attends Cheondo[employer] service: Not on file     Active member of club or organization: Not on file     Attends meetings of clubs or organizations: Not on file     Relationship status: Not on file     Intimate partner violence     Fear of current or ex partner: Not on file     Emotionally abused: Not on file     Physically abused: Not on file     Forced sexual activity: Not on file   Other Topics Concern     Parent/sibling w/ CABG, MI or angioplasty before 65F 55M? Not Asked   Social History Narrative     Not on file     Current Outpatient Medications   Medication Sig     Calcium Carbonate-Vit D-Min (CALTRATE MINIS PLUS MINERALS) 300-800 MG-UNIT TABS      estradiol (ESTRACE VAGINAL) 0.1 MG/GM vaginal cream Place 1 g vaginally three times a week     lisinopril (PRINIVIL/ZESTRIL) 40 MG tablet Take 20 mg by mouth daily      simvastatin (ZOCOR) 20 MG tablet      No current facility-administered medications for this visit.      No Known Allergies    Past medical, surgical, social and family history were reviewed and updated in EPIC.    EXAM:  /74   Pulse 72   Ht 1.575 m (5' 2\")   Wt 59.9 kg (132 lb)   BMI 24.14 kg/m     BMI: Body mass index is 24.14 kg/m .    Constitutional: Appearance: Well nourished, well developed alert, in no acute distress  Breasts: Inspection of Breasts:  No lymphadenopathy " present    Palpation of Breasts and Axillae:  No masses present on palpation, no  breast tenderness    Axillary Lymph Nodes:  No lymphadenopathy present  Neurologic/Psychiatric:    Mental Status:  Oriented X3     Pelvic Exam:  External Genitalia:     Normal appearance for age, no discharge present, no tenderness present, no inflammatory lesions present, color normal  Vagina:     Normal vaginal vault without central or paravaginal defects, ATROPHIC  Bladder:     Nontender to palpation  Urethra:   Urethral Body:  Urethra palpation normal, urethra structural support normal   Urethral Meatus:  No erythema or lesions present  Cervix:     Appearance healthy, no lesions present, nontender to palpation, no bleeding present  Uterus:     Nontender to palpation, no masses present, position anteflexed, mobility: normal  Adnexa:     No adnexal tenderness present, no adnexal masses present  Perineum:     Perineum within normal limits, no evidence of trauma, no rashes or skin lesions present  Inguinal Lymph Nodes:     No lymphadenopathy present      Body mass index is 24.14 kg/m .     reports that she has never smoked. She has never used smokeless tobacco.      ASSESSMENT:  70 year old female with satisfactory annual exam.  Bone density shows 1.0 at the spine and -1.0 at the hip mean    ICD-10-CM    1. Encounter for gynecological examination without abnormal finding  Z01.419 Medicare Limited Visit     HPV High Risk Types DNA Cervical     Pap imaged thin layer screen with HPV - recommended age 30 - 65 years (select HPV order below)   2. Vaginal atrophy  N95.2        COUNSELING:   Reviewed preventive health counseling, as reflected in patient instructions       Regular exercise       Healthy diet/nutrition       Osteoporosis Prevention/Bone Health    PLAN/PATIENT INSTRUCTIONS:    We will encourage the patient to continue her estrogen.  Her Pap and mammogram results will be forwarded to her her bone density was reviewed.    Cody SERRANO  MD S[place]

## 2020-12-10 ENCOUNTER — OFFICE VISIT (OUTPATIENT)
Dept: OBGYN | Facility: CLINIC | Age: 70
End: 2020-12-10
Payer: MEDICARE

## 2020-12-10 ENCOUNTER — ANCILLARY PROCEDURE (OUTPATIENT)
Dept: BONE DENSITY | Facility: CLINIC | Age: 70
End: 2020-12-10
Payer: MEDICARE

## 2020-12-10 ENCOUNTER — ANCILLARY PROCEDURE (OUTPATIENT)
Dept: MAMMOGRAPHY | Facility: CLINIC | Age: 70
End: 2020-12-10
Payer: MEDICARE

## 2020-12-10 VITALS
SYSTOLIC BLOOD PRESSURE: 126 MMHG | WEIGHT: 132 LBS | BODY MASS INDEX: 24.29 KG/M2 | HEART RATE: 72 BPM | DIASTOLIC BLOOD PRESSURE: 74 MMHG | HEIGHT: 62 IN

## 2020-12-10 DIAGNOSIS — Z01.419 ENCOUNTER FOR GYNECOLOGICAL EXAMINATION WITHOUT ABNORMAL FINDING: Primary | ICD-10-CM

## 2020-12-10 DIAGNOSIS — Z12.31 VISIT FOR SCREENING MAMMOGRAM: ICD-10-CM

## 2020-12-10 DIAGNOSIS — Z78.0 ASYMPTOMATIC POSTMENOPAUSAL STATE: ICD-10-CM

## 2020-12-10 DIAGNOSIS — N95.2 VAGINAL ATROPHY: ICD-10-CM

## 2020-12-10 PROCEDURE — 77080 DXA BONE DENSITY AXIAL: CPT | Performed by: OBSTETRICS & GYNECOLOGY

## 2020-12-10 PROCEDURE — G0101 CA SCREEN;PELVIC/BREAST EXAM: HCPCS | Performed by: OBSTETRICS & GYNECOLOGY

## 2020-12-10 PROCEDURE — 77067 SCR MAMMO BI INCL CAD: CPT | Performed by: RADIOLOGY

## 2020-12-10 PROCEDURE — 87624 HPV HI-RISK TYP POOLED RSLT: CPT | Performed by: OBSTETRICS & GYNECOLOGY

## 2020-12-10 PROCEDURE — 77063 BREAST TOMOSYNTHESIS BI: CPT | Performed by: RADIOLOGY

## 2020-12-10 PROCEDURE — G0145 SCR C/V CYTO,THINLAYER,RESCR: HCPCS | Performed by: OBSTETRICS & GYNECOLOGY

## 2020-12-10 RX ORDER — ESTRADIOL 0.1 MG/G
1 CREAM VAGINAL
Qty: 42.5 G | Refills: 6 | Status: SHIPPED | OUTPATIENT
Start: 2020-12-11 | End: 2021-12-14

## 2020-12-10 RX ORDER — CALCIUM/D3/MAG OX/COP/MANG/ZN 300 MG-20
TABLET ORAL
COMMUNITY

## 2020-12-10 ASSESSMENT — ANXIETY QUESTIONNAIRES
GAD7 TOTAL SCORE: 0
3. WORRYING TOO MUCH ABOUT DIFFERENT THINGS: NOT AT ALL
1. FEELING NERVOUS, ANXIOUS, OR ON EDGE: NOT AT ALL
6. BECOMING EASILY ANNOYED OR IRRITABLE: NOT AT ALL
5. BEING SO RESTLESS THAT IT IS HARD TO SIT STILL: NOT AT ALL
7. FEELING AFRAID AS IF SOMETHING AWFUL MIGHT HAPPEN: NOT AT ALL
2. NOT BEING ABLE TO STOP OR CONTROL WORRYING: NOT AT ALL

## 2020-12-10 ASSESSMENT — MIFFLIN-ST. JEOR: SCORE: 1072

## 2020-12-10 ASSESSMENT — PATIENT HEALTH QUESTIONNAIRE - PHQ9
SUM OF ALL RESPONSES TO PHQ QUESTIONS 1-9: 0
5. POOR APPETITE OR OVEREATING: NOT AT ALL

## 2020-12-11 ASSESSMENT — ANXIETY QUESTIONNAIRES: GAD7 TOTAL SCORE: 0

## 2020-12-15 LAB
COPATH REPORT: NORMAL
PAP: NORMAL

## 2021-03-31 DIAGNOSIS — N95.2 VAGINAL ATROPHY: ICD-10-CM

## 2021-03-31 RX ORDER — ESTRADIOL 0.1 MG/G
1 CREAM VAGINAL
Qty: 42.5 G | Refills: 6 | OUTPATIENT
Start: 2021-03-31

## 2021-03-31 NOTE — TELEPHONE ENCOUNTER
"Requested Prescriptions   Pending Prescriptions Disp Refills     estradiol (ESTRACE VAGINAL) 0.1 MG/GM vaginal cream 42.5 g 6     Sig: Place 1 g vaginally three times a week       Hormone Replacement Therapy Passed - 3/31/2021 10:36 AM        Passed - Blood pressure under 140/90 in past 12 months     BP Readings from Last 3 Encounters:   12/10/20 126/74   12/09/19 112/58   12/06/18 106/62                 Passed - Recent (12 mo) or future (30 days) visit within the authorizing provider's specialty     Patient has had an office visit with the authorizing provider or a provider within the authorizing providers department within the previous 12 mos or has a future within next 30 days. See \"Patient Info\" tab in inbasket, or \"Choose Columns\" in Meds & Orders section of the refill encounter.              Passed - Patient has mammogram in past 2 years on file if age 50-75        Passed - Medication is active on med list        Passed - Patient is 18 years of age or older        Passed - No active pregnancy on record        Passed - No positive pregnancy test on record in past 12 months           Last Written Prescription Date:  12/11/2020  Last Fill Quantity: 42.5g,  # refills: 6   Last office visit: 12/10/2020 with prescribing provider:  Cody Cochran   Future Office Visit:  none          "

## 2021-03-31 NOTE — TELEPHONE ENCOUNTER
"Refill request refused due to :   [x] Refills available at pharmacy.  Request sent back to pharmacy as \"duplicate\"  [] Patient report no longer needing it  [] Medication discontinued           Diane Banks RN on 3/31/2021 at 3:21 PM    "

## 2021-12-14 ENCOUNTER — OFFICE VISIT (OUTPATIENT)
Dept: OBGYN | Facility: CLINIC | Age: 71
End: 2021-12-14
Payer: MEDICARE

## 2021-12-14 ENCOUNTER — ANCILLARY PROCEDURE (OUTPATIENT)
Dept: MAMMOGRAPHY | Facility: CLINIC | Age: 71
End: 2021-12-14
Payer: MEDICARE

## 2021-12-14 VITALS
WEIGHT: 132.4 LBS | HEIGHT: 62 IN | SYSTOLIC BLOOD PRESSURE: 116 MMHG | HEART RATE: 72 BPM | BODY MASS INDEX: 24.37 KG/M2 | DIASTOLIC BLOOD PRESSURE: 68 MMHG

## 2021-12-14 DIAGNOSIS — N95.2 VAGINAL ATROPHY: ICD-10-CM

## 2021-12-14 DIAGNOSIS — Z12.31 VISIT FOR SCREENING MAMMOGRAM: ICD-10-CM

## 2021-12-14 DIAGNOSIS — Z01.419 ENCOUNTER FOR GYNECOLOGICAL EXAMINATION WITHOUT ABNORMAL FINDING: Primary | ICD-10-CM

## 2021-12-14 PROCEDURE — 77063 BREAST TOMOSYNTHESIS BI: CPT | Mod: TC | Performed by: RADIOLOGY

## 2021-12-14 PROCEDURE — G0101 CA SCREEN;PELVIC/BREAST EXAM: HCPCS | Performed by: OBSTETRICS & GYNECOLOGY

## 2021-12-14 PROCEDURE — G0145 SCR C/V CYTO,THINLAYER,RESCR: HCPCS | Performed by: OBSTETRICS & GYNECOLOGY

## 2021-12-14 PROCEDURE — 87624 HPV HI-RISK TYP POOLED RSLT: CPT | Performed by: OBSTETRICS & GYNECOLOGY

## 2021-12-14 PROCEDURE — 77067 SCR MAMMO BI INCL CAD: CPT | Mod: TC | Performed by: RADIOLOGY

## 2021-12-14 RX ORDER — ESTRADIOL 0.1 MG/G
1 CREAM VAGINAL
Qty: 42.5 G | Refills: 6 | Status: SHIPPED | OUTPATIENT
Start: 2021-12-15 | End: 2023-01-17

## 2021-12-14 ASSESSMENT — ANXIETY QUESTIONNAIRES
1. FEELING NERVOUS, ANXIOUS, OR ON EDGE: NOT AT ALL
3. WORRYING TOO MUCH ABOUT DIFFERENT THINGS: NOT AT ALL
5. BEING SO RESTLESS THAT IT IS HARD TO SIT STILL: NOT AT ALL
IF YOU CHECKED OFF ANY PROBLEMS ON THIS QUESTIONNAIRE, HOW DIFFICULT HAVE THESE PROBLEMS MADE IT FOR YOU TO DO YOUR WORK, TAKE CARE OF THINGS AT HOME, OR GET ALONG WITH OTHER PEOPLE: NOT DIFFICULT AT ALL
6. BECOMING EASILY ANNOYED OR IRRITABLE: NOT AT ALL
7. FEELING AFRAID AS IF SOMETHING AWFUL MIGHT HAPPEN: NOT AT ALL
GAD7 TOTAL SCORE: 0
2. NOT BEING ABLE TO STOP OR CONTROL WORRYING: NOT AT ALL

## 2021-12-14 ASSESSMENT — PATIENT HEALTH QUESTIONNAIRE - PHQ9
SUM OF ALL RESPONSES TO PHQ QUESTIONS 1-9: 0
5. POOR APPETITE OR OVEREATING: NOT AT ALL

## 2021-12-14 ASSESSMENT — MIFFLIN-ST. JEOR: SCORE: 1064.84

## 2021-12-14 NOTE — PROGRESS NOTES
Maribel is a 71 year old No obstetric history on file. female who presents for Medicare Limited exam.     Do you have a Health Care Directive?: No, advance care planning information given to patient to review.  Patient plans to discuss their wishes with loved ones or provider.      Fall risk:   Fallen 2 or more times in the past year?: No  Any fall with injury in the past year?: No    HPI : The patient is seen at this time for her annual exam.  She had one abnormal Pap smear in her early 20s but has not had HPV identified.  This can be her last Pap smear but she will continue her screening.  She is fully vaccinated and boosted.      GYNECOLOGIC HISTORY:  No LMP recorded. Patient is postmenopausal..   reports that she has never smoked. She has never used smokeless tobacco.    STD testing offered?  Declined  Last PHQ-9 score on record=   PHQ-9 SCORE 12/10/2020   PHQ-9 Total Score 0     Last GAD7 score on record=   CHENG-7 SCORE 12/6/2018 12/9/2019 12/10/2020   Total Score 0 0 0       HEALTH MAINTENANCE:  Cholesterol: (No results found for: CHOL   Last Mammo: One year ago, Result: Normal, Next Mammo: Today   Pap:   Lab Results   Component Value Date    PAP NIL, HPV- 12/10/2020    PAP NIL 12/09/2019    PAP NIL 12/06/2018      DEXA:  12/10/2020  Colonoscopy:  01/11/2019, Result:  Polyps, Next Colonoscopy: 3 years.    HISTORY:  OB History   No obstetric history on file.     Past Medical History:   Diagnosis Date     HTN (hypertension)      Lumbar disc disease      Past Surgical History:   Procedure Laterality Date     AS ENDOMETRIAL BIOPSY W/O CERVICAL DILATION  2000     BACK SURGERY  1997    Lumbar discs     COLONOSCOPY  2014     DILATION AND CURETTAGE, OPERATIVE HYSTEROSCOPY, COMBINED  2002     Family History   Problem Relation Age of Onset     Other Cancer Mother         ovarian     Other Cancer Paternal Aunt         uterine     Social History     Socioeconomic History     Marital status:      Spouse name: Not on  "file     Number of children: Not on file     Years of education: Not on file     Highest education level: Not on file   Occupational History     Employer: RETIRED   Tobacco Use     Smoking status: Never Smoker     Smokeless tobacco: Never Used   Substance and Sexual Activity     Alcohol use: Yes     Drug use: No     Sexual activity: Yes     Partners: Male     Birth control/protection: Post-menopausal   Other Topics Concern     Parent/sibling w/ CABG, MI or angioplasty before 65F 55M? Not Asked   Social History Narrative     Not on file     Social Determinants of Health     Financial Resource Strain: Not on file   Food Insecurity: Not on file   Transportation Needs: Not on file   Physical Activity: Not on file   Stress: Not on file   Social Connections: Not on file   Intimate Partner Violence: Not on file   Housing Stability: Not on file     Current Outpatient Medications   Medication Sig     Calcium Carbonate-Vit D-Min (CALTRATE MINIS PLUS MINERALS) 300-800 MG-UNIT TABS      estradiol (ESTRACE VAGINAL) 0.1 MG/GM vaginal cream Place 1 g vaginally three times a week     lisinopril (PRINIVIL/ZESTRIL) 40 MG tablet Take 20 mg by mouth daily      simvastatin (ZOCOR) 20 MG tablet      No current facility-administered medications for this visit.     No Known Allergies    Past medical, surgical, social and family history were reviewed and updated in [facility].    EXAM:  /68   Pulse 72   Ht 1.568 m (5' 1.75\")   Wt 60.1 kg (132 lb 6.4 oz)   Breastfeeding No   BMI 24.41 kg/m     BMI: Body mass index is 24.41 kg/m .    Constitutional: Appearance: Well nourished, well developed alert, in no acute distress  Breasts: Inspection of Breasts:  No lymphadenopathy present    Palpation of Breasts and Axillae:  No masses present on palpation, no  breast tenderness    Axillary Lymph Nodes:  No lymphadenopathy present  Neurologic/Psychiatric:    Mental Status:  Oriented X3     Pelvic Exam:  External Genitalia:     Normal appearance " for age, no discharge present, no tenderness present, no inflammatory lesions present, color normal  Vagina:     Normal vaginal vault without central or paravaginal defects, ATROPHIC  Bladder:     Nontender to palpation  Urethra:   Urethral Body:  Urethra palpation normal, urethra structural support normal   Urethral Meatus:  No erythema or lesions present  Cervix:     Appearance healthy, no lesions present, nontender to palpation, no bleeding present  Uterus:     Nontender to palpation, no masses present, position anteflexed, mobility: normal  Adnexa:     No adnexal tenderness present, no adnexal masses present  Perineum:     Perineum within normal limits, no evidence of trauma, no rashes or skin lesions present  Inguinal Lymph Nodes:     No lymphadenopathy present      Body mass index is 24.41 kg/m .     reports that she has never smoked. She has never used smokeless tobacco.      ASSESSMENT:  71 year old female with satisfactory annual exam.    ICD-10-CM    1. Encounter for gynecological examination without abnormal finding  Z01.419    2. Vaginal atrophy  N95.2        COUNSELING:   Reviewed preventive health counseling, as reflected in patient instructions       Regular exercise       Healthy diet/nutrition    PLAN/PATIENT INSTRUCTIONS:    We will convey the patient's screening test when available.  She will return to the office in 1 year for ongoing follow-up but will not need any further Pap screening.    Cody Cochran MD

## 2021-12-15 ASSESSMENT — ANXIETY QUESTIONNAIRES: GAD7 TOTAL SCORE: 0

## 2021-12-17 LAB
BKR LAB AP GYN ADEQUACY: NORMAL
BKR LAB AP GYN INTERPRETATION: NORMAL
BKR LAB AP HPV REFLEX: NORMAL
BKR LAB AP PREVIOUS ABNORMAL: NORMAL
PATH REPORT.COMMENTS IMP SPEC: NORMAL
PATH REPORT.COMMENTS IMP SPEC: NORMAL
PATH REPORT.RELEVANT HX SPEC: NORMAL

## 2021-12-21 LAB
HUMAN PAPILLOMA VIRUS 16 DNA: NEGATIVE
HUMAN PAPILLOMA VIRUS 18 DNA: NEGATIVE
HUMAN PAPILLOMA VIRUS FINAL DIAGNOSIS: NORMAL
HUMAN PAPILLOMA VIRUS OTHER HR: NEGATIVE

## 2023-01-16 NOTE — PROGRESS NOTES
Maribel is a 72 year old No obstetric history on file. female who presents for annual exam.     Besides routine health maintenance, she has no other health concerns today .    Do you have a Health Care Directive?: Yes, patient states has an Advance Care Planning document and will bring a copy to the clinic.    Fall risk:   Fallen 2 or more times in the past year?: No  Any fall with injury in the past year?: No    HPI:  The patient's PCP is Dr. Chelsea Saini MD.  Pt here today for her annual gyn exam. She no longer needs pap smears. She has her dexa and mammo today.  She has been using her vag E2 cream. No PMB.    GYNECOLOGIC HISTORY:  No LMP recorded. Patient is postmenopausal.   reports that she has never smoked. She has never used smokeless tobacco.    Patient is sexually active.  STD testing offered?  Declined     Last PHQ-9 score on record=   PHQ-9 SCORE 1/17/2023   PHQ-9 Total Score 0     Last GAD7 score on record=   CHENG-7 SCORE 12/10/2020 12/14/2021 1/17/2023   Total Score 0 0 0     Alcohol Score = 4    HEALTH MAINTENANCE:  Cholesterol: Labs with PCP  Last Mammo: 12/14/21, Result: Normal, Next Mammo: Today   Pap: No longer indicated  Lab Results   Component Value Date    GYNINTERP  12/14/2021     Negative for Intraepithelial Lesion or Malignancy (NILM), NEG-HPV    PAP NIL, NEG-HPV 12/10/2020    PAP NIL, NEG-HPV 12/09/2019    PAP NIL, NEG-HPV 12/06/2018      DEXA: 12/10/20, Next Dexa: Today  Colonoscopy: 01/11/19, Result:  Polyp, repeat 5 years, Next Colonoscopy: 2024    Health maintenance updated:  yes    HISTORY:  OB History   No obstetric history on file.     Patient Active Problem List   Diagnosis   (none) - all problems resolved or deleted     Past Surgical History:   Procedure Laterality Date     AS ENDOMETRIAL BIOPSY W/O CERVICAL DILATION  2000     BACK SURGERY  1997    Lumbar discs     COLONOSCOPY  2014     DILATION AND CURETTAGE, OPERATIVE HYSTEROSCOPY, COMBINED  2002      Social History     Tobacco Use  "    Smoking status: Never     Smokeless tobacco: Never   Substance Use Topics     Alcohol use: Yes      Problem (# of Occurrences) Relation (Name,Age of Onset)    Other Cancer (2) Mother: ovarian, Paternal Aunt: uterine            Current Outpatient Medications   Medication Sig     Biotin 5 MG TBDP Take 1/2 tablet by mouth.     Calcium Carbonate-Vit D-Min (CALTRATE MINIS PLUS MINERALS) 300-800 MG-UNIT TABS      [START ON 1/18/2023] estradiol (ESTRACE VAGINAL) 0.1 MG/GM vaginal cream Place 1 g vaginally three times a week     lisinopril (PRINIVIL/ZESTRIL) 40 MG tablet Take 20 mg by mouth daily      simvastatin (ZOCOR) 20 MG tablet      BINAXNOW COVID-19 AG HOME TEST KIT FOLLOW 'S DIRECTIONS. (Patient not taking: Reported on 1/17/2023)     No current facility-administered medications for this visit.       Allergies   Allergen Reactions     Echinacea Other (See Comments)       Past medical, surgical, social and family history were reviewed and updated in EPIC.    ROS:   12 point review of systems negative other than symptoms noted below or in the HPI.  No urinary frequency or dysuria, bladder or kidney problems    EXAM:  /66   Ht 1.569 m (5' 1.77\")   Wt 59.4 kg (131 lb)   BMI 24.14 kg/m     BMI: Body mass index is 24.14 kg/m .    EXAM:  Constitutional: Appearance: Well nourished, well developed alert, in no acute distress  Neck:  Lymph Nodes:  No lymphadenopathy present    Thyroid:  Gland size normal, nontender, no nodules or masses present  on palpation  Chest:  Respiratory Effort:  Breathing unlabored  Cardiovascular:Heart    Auscultation:  Regular rate, normal rhythm, no murmurs present  Breasts: Inspection of Breasts:  No lymphadenopathy present., Palpation of Breasts and Axillae:  No masses present on palpation, no breast tenderness., Axillary Lymph Nodes:  No lymphadenopathy present. and No nodularity, asymmetry or nipple discharge bilaterally.  Gastrointestinal:  Abdominal Examination:  " Abdomen nontender to palpation, tone normal without     rigidity or guarding, no masses present, umbilicus without lesions    Liver and speen:  No hepatomegaly present, liver nontender to palpation    Hernias:  No hernias present  Lymphatic: Lymph Nodes:  No other lymphadenopathy present  Skin:  General Inspection:  No rashes present, no lesions present, no areas of  discoloration.    Genitalia and Groin:  No rashes present, no lesions present, no areas of  discoloration, no masses present  Neurologic/Psychiatric:    Mental Status:  Oriented X3     Pelvic Exam:  External Genitalia:     Normal appearance for age, no discharge present, no tenderness present, no inflammatory lesions present, color normal  Vagina:     Normal vaginal vault without central or paravaginal defects, ATROPHIC  Bladder:     Nontender to palpation  Urethra:   Urethral Body:  Urethra palpation normal, urethra structural support normal   Urethral Meatus:  No erythema or lesions present  Cervix:     Appearance healthy, no lesions present, nontender to palpation, no bleeding present  Uterus:     Nontender to palpation, no masses present, position anteflexed, mobility: normal  Adnexa:     No adnexal tenderness present, no adnexal masses present  Perineum:     Perineum within normal limits, no evidence of trauma, no rashes or skin lesions present  Inguinal Lymph Nodes:     No lymphadenopathy present      COUNSELING:   Special attention given to:       Regular exercise       Healthy diet/nutrition       Bladder control       (Kelly)menopause management    BMI:  Body mass index is 24.14 kg/m .     reports that she has never smoked. She has never used smokeless tobacco.      ASSESSMENT:  72 year old female with satisfactory annual exam.    ICD-10-CM    1. Encounter for gynecological examination without abnormal finding  Z01.419       2. Vaginal atrophy  N95.2 estradiol (ESTRACE VAGINAL) 0.1 MG/GM vaginal cream          PLAN:  73 yo female with a normal  PM gyn exam. Continue E2 use.   dexa is stable. Recommended Ca, Vit D and Mg.     ENRIQUETA Jackson CNP

## 2023-01-17 ENCOUNTER — ANCILLARY PROCEDURE (OUTPATIENT)
Dept: MAMMOGRAPHY | Facility: CLINIC | Age: 73
End: 2023-01-17
Payer: MEDICARE

## 2023-01-17 ENCOUNTER — ANCILLARY PROCEDURE (OUTPATIENT)
Dept: BONE DENSITY | Facility: CLINIC | Age: 73
End: 2023-01-17
Payer: MEDICARE

## 2023-01-17 ENCOUNTER — OFFICE VISIT (OUTPATIENT)
Dept: OBGYN | Facility: CLINIC | Age: 73
End: 2023-01-17
Payer: COMMERCIAL

## 2023-01-17 VITALS
BODY MASS INDEX: 24.11 KG/M2 | SYSTOLIC BLOOD PRESSURE: 126 MMHG | HEIGHT: 62 IN | DIASTOLIC BLOOD PRESSURE: 66 MMHG | WEIGHT: 131 LBS

## 2023-01-17 DIAGNOSIS — Z12.31 VISIT FOR SCREENING MAMMOGRAM: ICD-10-CM

## 2023-01-17 DIAGNOSIS — Z01.419 ENCOUNTER FOR GYNECOLOGICAL EXAMINATION WITHOUT ABNORMAL FINDING: Primary | ICD-10-CM

## 2023-01-17 DIAGNOSIS — N95.2 VAGINAL ATROPHY: ICD-10-CM

## 2023-01-17 DIAGNOSIS — Z78.0 ASYMPTOMATIC POSTMENOPAUSAL STATE: ICD-10-CM

## 2023-01-17 PROCEDURE — 77080 DXA BONE DENSITY AXIAL: CPT | Performed by: OBSTETRICS & GYNECOLOGY

## 2023-01-17 PROCEDURE — 77067 SCR MAMMO BI INCL CAD: CPT | Mod: TC | Performed by: RADIOLOGY

## 2023-01-17 PROCEDURE — 77063 BREAST TOMOSYNTHESIS BI: CPT | Mod: TC | Performed by: RADIOLOGY

## 2023-01-17 PROCEDURE — 99397 PER PM REEVAL EST PAT 65+ YR: CPT | Performed by: NURSE PRACTITIONER

## 2023-01-17 RX ORDER — ESTRADIOL 0.1 MG/G
1 CREAM VAGINAL
Qty: 42.5 G | Refills: 6 | Status: SHIPPED | OUTPATIENT
Start: 2023-01-18 | End: 2024-01-18

## 2023-01-17 RX ORDER — COVID-19 MOLECULAR TEST ASSAY
KIT MISCELLANEOUS
COMMUNITY
Start: 2022-12-12 | End: 2024-01-18

## 2023-01-17 ASSESSMENT — ANXIETY QUESTIONNAIRES
GAD7 TOTAL SCORE: 0
5. BEING SO RESTLESS THAT IT IS HARD TO SIT STILL: NOT AT ALL
GAD7 TOTAL SCORE: 0
6. BECOMING EASILY ANNOYED OR IRRITABLE: NOT AT ALL
2. NOT BEING ABLE TO STOP OR CONTROL WORRYING: NOT AT ALL
3. WORRYING TOO MUCH ABOUT DIFFERENT THINGS: NOT AT ALL
1. FEELING NERVOUS, ANXIOUS, OR ON EDGE: NOT AT ALL
7. FEELING AFRAID AS IF SOMETHING AWFUL MIGHT HAPPEN: NOT AT ALL

## 2023-01-17 ASSESSMENT — PATIENT HEALTH QUESTIONNAIRE - PHQ9
SUM OF ALL RESPONSES TO PHQ QUESTIONS 1-9: 0
5. POOR APPETITE OR OVEREATING: NOT AT ALL

## 2024-01-16 NOTE — PROGRESS NOTES
"  SUBJECTIVE:                                                   Maribel Aguilar is a 73 year old female who presents to clinic today for the following health issue(s):  Patient presents with:  Physical      HPI:  Patient here today for her annual GYN exam and mammogram.  She is feeling well.  She is sexually active with no discomfort.  She is using her vaginal estrogen cream 3 times a week.    She has good bladder control.  She has been taking her calcium and magnesium.  She is due for a bone scan next year.    She does have questions about a \"bump that she felt on the lower right perineum.  It is not tender or painful and it does not itch.    No LMP recorded. Patient is postmenopausal..     Patient is sexually active, No obstetric history on file..  Using menopause for contraception.    reports that she has never smoked. She has never used smokeless tobacco.    STD testing offered?  Declined    Health maintenance updated:  vaccines     Today's PHQ-2 Score:       1/18/2024     1:32 PM   PHQ-2 ( 1999 [employer])   Q1: Little interest or pleasure in doing things 0   Q2: Feeling down, depressed or hopeless 0   PHQ-2 Score 0     Today's PHQ-9 Score:       1/18/2024     2:02 PM   PHQ-9 SCORE   PHQ-9 Total Score 0     Today's CHENG-7 Score:       1/18/2024     2:02 PM   CHENG-7 SCORE   Total Score 1       Problem list and histories reviewed & adjusted, as indicated.  Additional history: as documented.    Patient Active Problem List   Diagnosis    Hyperlipidemia    Essential hypertension    Degeneration of lumbar or lumbosacral intervertebral disc    Impingement syndrome of right shoulder     Past Surgical History:   Procedure Laterality Date    AS ENDOMETRIAL BIOPSY W/O CERVICAL DILATION  2000    BACK SURGERY  1997    Lumbar discs    COLONOSCOPY  2014    DILATION AND CURETTAGE, OPERATIVE HYSTEROSCOPY, COMBINED  2002      Social History     Tobacco Use    Smoking status: Never    Smokeless tobacco: Never   Substance Use Topics    " "Alcohol use: Yes      Problem (# of Occurrences) Relation (Name,Age of Onset)    Other Cancer (2) Mother: ovarian, Paternal Aunt: uterine              Current Outpatient Medications   Medication Sig    Biotin 5 MG TBDP Take 1/2 tablet by mouth.    Calcium Carbonate-Vit D-Min (CALTRATE MINIS PLUS MINERALS) 300-800 MG-UNIT TABS     [START ON 1/19/2024] estradiol (ESTRACE VAGINAL) 0.1 MG/GM vaginal cream Place 1 g vaginally three times a week    lisinopril (ZESTRIL) 20 MG tablet Take 1 tablet by mouth daily at 2 pm    simvastatin (ZOCOR) 20 MG tablet      No current facility-administered medications for this visit.     Allergies   Allergen Reactions    Echinacea Other (See Comments)       ROS:  12 point review of systems negative other than symptoms noted below or in the HPI.  No urinary frequency or dysuria, bladder or kidney problems      OBJECTIVE:     /70   Ht 1.568 m (5' 1.75\")   Wt 57.9 kg (127 lb 9.6 oz)   Breastfeeding No   BMI 23.53 kg/m    Body mass index is 23.53 kg/m .    Exam:  Constitutional:  Appearance: Well nourished, well developed alert, in no acute distress  Breasts:  Inspection of Breasts:  Symmetric bilaterally.  No puckering.  No skin changes.  Palpation of Breasts and Axillae:  No masses present on palpation, no breast tenderness Axillary Lymph Nodes:  No lymphadenopathy present  Neurologic:  Mental Status:  Oriented X3.  Normal strength and tone, sensory exam grossly normal, mentation intact and speech normal.    Psychiatric:  Mentation appears normal and affect normal/bright.  Pelvic Exam:  External Genitalia:     Normal appearance for age, no discharge present, no tenderness present, no inflammatory lesions present, color normal  Vagina:     Normal vaginal vault without central or paravaginal defects, no discharge present, no inflammatory lesions present, no masses present  Bladder:     Nontender to palpation  Urethra:   Urethral Body:  Urethra palpation normal, urethra structural " support normal   Urethral Meatus:  No erythema or lesions present  Cervix:     Appearance healthy, no lesions present, nontender to palpation, no bleeding present  Uterus:     Uterus: firm, normal sized and nontender, anteverted in position.   Adnexa:     No adnexal tenderness present, no adnexal masses present  Perineum:     Perineum within normal limits, no evidence of trauma, no rashes or skin lesions present  Small pencil eraser sized epidermal cyst with no tenderness or erythema on the right lower perineum.  Anus:     Anus within normal limits, no hemorrhoids present  Inguinal Lymph Nodes:     No lymphadenopathy present  Pubic Hair:     Normal pubic hair distribution for age  Genitalia and Groin:     No rashes present, no lesions present, no areas of discoloration, no masses present     In-Clinic Test Results:  No results found for this or any previous visit (from the past 24 hour(s)).    ASSESSMENT/PLAN:                                                        ICD-10-CM    1. Encounter for gynecological examination without abnormal finding  Z01.419       2. Vaginal atrophy  N95.2 estradiol (ESTRACE VAGINAL) 0.1 MG/GM vaginal cream          There are no Patient Instructions on file for this visit.    73-year-old postmenopausal female with a normal postmenopausal GYN exam.  She has excellent estrogen effect at the level of the vagina.  We have asked her to continue with 3 times weekly dosing.  She needs no further Pap screening.  She is to continue with annual mammograms.  She does need a bone scan again next year and we will order this for her.    ENRIQUETA Jackson CNP  [facility] [place]

## 2024-01-18 ENCOUNTER — OFFICE VISIT (OUTPATIENT)
Dept: OBGYN | Facility: CLINIC | Age: 74
End: 2024-01-18
Payer: MEDICARE

## 2024-01-18 ENCOUNTER — ANCILLARY PROCEDURE (OUTPATIENT)
Dept: MAMMOGRAPHY | Facility: CLINIC | Age: 74
End: 2024-01-18
Payer: MEDICARE

## 2024-01-18 VITALS
WEIGHT: 127.6 LBS | SYSTOLIC BLOOD PRESSURE: 110 MMHG | DIASTOLIC BLOOD PRESSURE: 70 MMHG | HEIGHT: 62 IN | BODY MASS INDEX: 23.48 KG/M2

## 2024-01-18 DIAGNOSIS — N95.2 VAGINAL ATROPHY: ICD-10-CM

## 2024-01-18 DIAGNOSIS — Z12.31 VISIT FOR SCREENING MAMMOGRAM: ICD-10-CM

## 2024-01-18 DIAGNOSIS — Z01.419 ENCOUNTER FOR GYNECOLOGICAL EXAMINATION WITHOUT ABNORMAL FINDING: Primary | ICD-10-CM

## 2024-01-18 PROBLEM — M75.41 IMPINGEMENT SYNDROME OF RIGHT SHOULDER: Status: ACTIVE | Noted: 2023-03-31

## 2024-01-18 PROCEDURE — 99213 OFFICE O/P EST LOW 20 MIN: CPT | Mod: 25 | Performed by: NURSE PRACTITIONER

## 2024-01-18 PROCEDURE — 99207 PR ANNUAL WELLNESS VISIT, PPS, SUBSEQUENT STAT: CPT | Performed by: NURSE PRACTITIONER

## 2024-01-18 PROCEDURE — 77063 BREAST TOMOSYNTHESIS BI: CPT | Mod: TC | Performed by: RADIOLOGY

## 2024-01-18 PROCEDURE — 77067 SCR MAMMO BI INCL CAD: CPT | Mod: TC | Performed by: RADIOLOGY

## 2024-01-18 PROCEDURE — G0101 CA SCREEN;PELVIC/BREAST EXAM: HCPCS | Performed by: NURSE PRACTITIONER

## 2024-01-18 RX ORDER — LISINOPRIL 20 MG/1
1 TABLET ORAL
COMMUNITY
Start: 2023-11-29

## 2024-01-18 RX ORDER — ESTRADIOL 0.1 MG/G
1 CREAM VAGINAL
Qty: 42.5 G | Refills: 6 | Status: SHIPPED | OUTPATIENT
Start: 2024-01-19

## 2024-01-18 ASSESSMENT — ANXIETY QUESTIONNAIRES
GAD7 TOTAL SCORE: 1
GAD7 TOTAL SCORE: 1
2. NOT BEING ABLE TO STOP OR CONTROL WORRYING: NOT AT ALL
IF YOU CHECKED OFF ANY PROBLEMS ON THIS QUESTIONNAIRE, HOW DIFFICULT HAVE THESE PROBLEMS MADE IT FOR YOU TO DO YOUR WORK, TAKE CARE OF THINGS AT HOME, OR GET ALONG WITH OTHER PEOPLE: NOT DIFFICULT AT ALL
7. FEELING AFRAID AS IF SOMETHING AWFUL MIGHT HAPPEN: NOT AT ALL
6. BECOMING EASILY ANNOYED OR IRRITABLE: NOT AT ALL
1. FEELING NERVOUS, ANXIOUS, OR ON EDGE: SEVERAL DAYS
3. WORRYING TOO MUCH ABOUT DIFFERENT THINGS: NOT AT ALL
5. BEING SO RESTLESS THAT IT IS HARD TO SIT STILL: NOT AT ALL

## 2024-01-18 ASSESSMENT — PATIENT HEALTH QUESTIONNAIRE - PHQ9
5. POOR APPETITE OR OVEREATING: NOT AT ALL
SUM OF ALL RESPONSES TO PHQ QUESTIONS 1-9: 0

## 2024-11-01 ENCOUNTER — LAB REQUISITION (OUTPATIENT)
Dept: LAB | Facility: CLINIC | Age: 74
End: 2024-11-01
Payer: MEDICARE

## 2024-11-01 DIAGNOSIS — L08.9 LOCAL INFECTION OF THE SKIN AND SUBCUTANEOUS TISSUE, UNSPECIFIED: ICD-10-CM

## 2024-11-01 PROCEDURE — 87070 CULTURE OTHR SPECIMN AEROBIC: CPT | Mod: ORL | Performed by: DERMATOLOGY

## 2024-11-01 PROCEDURE — 87529 HSV DNA AMP PROBE: CPT | Mod: ORL | Performed by: DERMATOLOGY

## 2024-11-02 LAB
HSV1 DNA SPEC QL NAA+PROBE: NOT DETECTED
HSV2 DNA SPEC QL NAA+PROBE: NOT DETECTED

## 2024-11-03 LAB — BACTERIA WND CULT: NORMAL

## 2025-01-27 ENCOUNTER — ANCILLARY PROCEDURE (OUTPATIENT)
Dept: BONE DENSITY | Facility: CLINIC | Age: 75
End: 2025-01-27
Payer: MEDICARE

## 2025-01-27 ENCOUNTER — ANCILLARY PROCEDURE (OUTPATIENT)
Dept: MAMMOGRAPHY | Facility: CLINIC | Age: 75
End: 2025-01-27
Payer: MEDICARE

## 2025-01-27 ENCOUNTER — OFFICE VISIT (OUTPATIENT)
Dept: OBGYN | Facility: CLINIC | Age: 75
End: 2025-01-27
Payer: MEDICARE

## 2025-01-27 VITALS
SYSTOLIC BLOOD PRESSURE: 112 MMHG | HEIGHT: 62 IN | BODY MASS INDEX: 23.26 KG/M2 | DIASTOLIC BLOOD PRESSURE: 68 MMHG | WEIGHT: 126.4 LBS

## 2025-01-27 DIAGNOSIS — Z78.0 ASYMPTOMATIC POSTMENOPAUSAL STATE: ICD-10-CM

## 2025-01-27 DIAGNOSIS — Z12.31 VISIT FOR SCREENING MAMMOGRAM: ICD-10-CM

## 2025-01-27 DIAGNOSIS — N95.2 VAGINAL ATROPHY: ICD-10-CM

## 2025-01-27 DIAGNOSIS — R30.9 PAINFUL URINATION: Primary | ICD-10-CM

## 2025-01-27 LAB
ALBUMIN UR-MCNC: NEGATIVE MG/DL
APPEARANCE UR: ABNORMAL
BACTERIA #/AREA URNS HPF: ABNORMAL /HPF
BILIRUB UR QL STRIP: NEGATIVE
COLOR UR AUTO: YELLOW
GLUCOSE UR STRIP-MCNC: NEGATIVE MG/DL
HGB UR QL STRIP: ABNORMAL
KETONES UR STRIP-MCNC: NEGATIVE MG/DL
LEUKOCYTE ESTERASE UR QL STRIP: ABNORMAL
MUCOUS THREADS #/AREA URNS LPF: PRESENT /LPF
NITRATE UR QL: NEGATIVE
PH UR STRIP: 6 [PH] (ref 5–7)
RBC #/AREA URNS AUTO: ABNORMAL /HPF
SP GR UR STRIP: 1.02 (ref 1–1.03)
SQUAMOUS #/AREA URNS AUTO: ABNORMAL /LPF
UROBILINOGEN UR STRIP-ACNC: 0.2 E.U./DL
WBC #/AREA URNS AUTO: ABNORMAL /HPF

## 2025-01-27 PROCEDURE — 87086 URINE CULTURE/COLONY COUNT: CPT | Performed by: NURSE PRACTITIONER

## 2025-01-27 PROCEDURE — 77063 BREAST TOMOSYNTHESIS BI: CPT | Mod: TC | Performed by: RADIOLOGY

## 2025-01-27 PROCEDURE — 77067 SCR MAMMO BI INCL CAD: CPT | Mod: TC | Performed by: RADIOLOGY

## 2025-01-27 PROCEDURE — 77080 DXA BONE DENSITY AXIAL: CPT | Mod: TC | Performed by: RADIOLOGY

## 2025-01-27 PROCEDURE — 99459 PELVIC EXAMINATION: CPT | Performed by: NURSE PRACTITIONER

## 2025-01-27 PROCEDURE — 99213 OFFICE O/P EST LOW 20 MIN: CPT | Performed by: NURSE PRACTITIONER

## 2025-01-27 PROCEDURE — 81001 URINALYSIS AUTO W/SCOPE: CPT | Performed by: NURSE PRACTITIONER

## 2025-01-27 RX ORDER — ESTRADIOL 0.1 MG/G
1 CREAM VAGINAL
Qty: 42.5 G | Refills: 6 | Status: SHIPPED | OUTPATIENT
Start: 2025-01-27

## 2025-01-27 RX ORDER — MULTIVITAMIN WITH IRON
1 TABLET ORAL DAILY
COMMUNITY

## 2025-01-27 RX ORDER — CETIRIZINE HYDROCHLORIDE 5 MG/1
5 TABLET ORAL DAILY
COMMUNITY

## 2025-01-27 NOTE — PROGRESS NOTES
"  SUBJECTIVE:                                                   Maribel Aguilar is a 74 year old female who presents to clinic today for the following health issue(s):  Patient presents with:  Physical      HPI:  Pt here today for her annual gyn exam, mammogram and dexa.   She is doing okay however she's had a \"tickle\" at the end of her urine stream and shoots up towards the bladder for the last 2 weeks.   UA is pending at this time.     She is using her E2 cream 3 times per week.     No LMP recorded. Patient is postmenopausal..     Patient is sexually active, .  Using menopause for contraception.    reports that she has never smoked. She has never used smokeless tobacco.    STD testing offered?  Declined    Health maintenance updated:  yes    Today's PHQ-2 Score:       2024     1:32 PM   PHQ-2 (  [employer])   Q1: Little interest or pleasure in doing things 0   Q2: Feeling down, depressed or hopeless 0   PHQ-2 Score 0     Today's PHQ-9 Score:       2024     2:02 PM   PHQ-9 SCORE   PHQ-9 Total Score 0     Today's CHENG-7 Score:       2024     2:02 PM   CHENG-7 SCORE   Total Score 1       Problem list and histories reviewed & adjusted, as indicated.  Additional history: as documented.    Patient Active Problem List   Diagnosis    Hyperlipidemia    Essential hypertension    Degeneration of lumbar or lumbosacral intervertebral disc    Impingement syndrome of right shoulder     Past Surgical History:   Procedure Laterality Date    AS ENDOMETRIAL BIOPSY W/O CERVICAL DILATION      BACK SURGERY      Lumbar discs    COLONOSCOPY      DILATION AND CURETTAGE, OPERATIVE HYSTEROSCOPY, COMBINED        Social History     Tobacco Use    Smoking status: Never    Smokeless tobacco: Never   Substance Use Topics    Alcohol use: Yes      Problem (# of Occurrences) Relation (Name,Age of Onset)    Other Cancer (2) Mother: ovarian, Paternal Aunt: uterine              Current Outpatient Medications " "  Medication Sig Dispense Refill    Biotin 5 MG TBDP Take 1/2 tablet by mouth.      Calcium Carbonate-Vit D-Min (CALTRATE MINIS PLUS MINERALS) 300-800 MG-UNIT TABS       cetirizine (ZYRTEC) 5 MG tablet Take 5 mg by mouth daily.      estradiol (ESTRACE VAGINAL) 0.1 MG/GM vaginal cream Place 1 g vaginally three times a week. 42.5 g 6    lisinopril (ZESTRIL) 20 MG tablet Take 1 tablet by mouth daily at 2 pm      magnesium 250 MG tablet Take 1 tablet by mouth daily.      simvastatin (ZOCOR) 20 MG tablet   3     No current facility-administered medications for this visit.     Allergies   Allergen Reactions    Echinacea Other (See Comments)       ROS:  12 point review of systems negative other than symptoms noted below or in the HPI.  POSITIVE for:, dysuria      OBJECTIVE:     /68   Ht 1.562 m (5' 1.5\")   Wt 57.3 kg (126 lb 6.4 oz)   BMI 23.50 kg/m    Body mass index is 23.5 kg/m .    Exam:  Constitutional:  Appearance: Well nourished, well developed alert, in no acute distress  Breasts:  Inspection of Breasts:  Symmetric bilaterally.  No puckering.  No skin changes.  Palpation of Breasts and Axillae:  No masses present on palpation, no breast tenderness Axillary Lymph Nodes:  No lymphadenopathy present  Neurologic:  Mental Status:  Oriented X3.  Normal strength and tone, sensory exam grossly normal, mentation intact and speech normal.    Psychiatric:  Mentation appears normal and affect normal/bright.  Pelvic Exam:  External Genitalia:     Normal appearance for age, no discharge present, no tenderness present, no inflammatory lesions present, color normal  Vagina:     Normal vaginal vault without central or paravaginal defects, no discharge present, no inflammatory lesions present, no masses present  Bladder:     Nontender to palpation  Urethra:   Urethral Body:  Urethra palpation normal, urethra structural support normal   Urethral Meatus:  No erythema or lesions present  Cervix:     Appearance healthy, no " lesions present, nontender to palpation, no bleeding present  Uterus:     Uterus: firm, normal sized and nontender, anteverted in position.   Adnexa:     No adnexal tenderness present, no adnexal masses present  Perineum:     Perineum within normal limits, no evidence of trauma, no rashes or skin lesions present  Anus:     Anus within normal limits, no hemorrhoids present  Inguinal Lymph Nodes:     No lymphadenopathy present  Pubic Hair:     Normal pubic hair distribution for age  Genitalia and Groin:     No rashes present, no lesions present, no areas of discoloration, no masses present     In-Clinic Test Results:  Results for orders placed or performed in visit on 01/27/25 (from the past 24 hours)   UA with Microscopic - lab collect   Result Value Ref Range    Color Urine Yellow Colorless, Straw, Light Yellow, Yellow    Appearance Urine Slightly Cloudy (A) Clear    Glucose Urine Negative Negative mg/dL    Bilirubin Urine Negative Negative    Ketones Urine Negative Negative mg/dL    Specific Gravity Urine 1.025 1.003 - 1.035    Blood Urine Trace (A) Negative    pH Urine 6.0 5.0 - 7.0    Protein Albumin Urine Negative Negative mg/dL    Urobilinogen Urine 0.2 0.2, 1.0 E.U./dL    Nitrite Urine Negative Negative    Leukocyte Esterase Urine Small (A) Negative   Urine Microscopic Exam   Result Value Ref Range    Bacteria Urine Moderate (A) None Seen /HPF    RBC Urine 5-10 (A) 0-2 /HPF /HPF    WBC Urine 25-50 (A) 0-5 /HPF /HPF    Squamous Epithelials Urine Few (A) None Seen /LPF    Mucus Urine Present (A) None Seen /LPF       ASSESSMENT/PLAN:                                                        ICD-10-CM    1. Painful urination  R30.9 UA with Microscopic - lab collect     UA with Microscopic - lab collect     Urine Microscopic Exam     Urine Culture Aerobic Bacterial - lab collect     Urine Culture Aerobic Bacterial - lab collect      2. Vaginal atrophy  N95.2 estradiol (ESTRACE VAGINAL) 0.1 MG/GM vaginal cream         75 yo female with a normal exam.   No bladder tenderness on exam. UA +, will send UC and treat if needed.     She'd also like a copy of her DEXA sent to her PCP Chelsea Saini MD at [facility]    ENRIQUETA Jackson [facility]

## 2025-01-27 NOTE — LETTER
January 28, 2025      Maribel Aguilar  [address] [address]        Dear ,    We are writing to inform you of your test results.    Your bone scan shows osteopenia, however overall your bones are stable.     You had a slight increase in bone density at your spine and a small decrease in density in your hips over the last 2 years.     Based on your scores and your fracture risk, we don't need to look at any prescription medication at this time.     Resulted Orders   DX Bone Density    Narrative    EXAM: DX AXIAL HIPS/SPINE  LOCATION: [facility]  DATE: 1/27/2025    INDICATION:  Asymptomatic postmenopausal state.  DEMOGRAPHICS: Age- 74 years. Gender- Female.  COMPARISON: 1/17/2023  TECHNIQUE: Dual-energy x-ray absorptiometry (DXA) performed with routine technique.    FINDINGS:    DXA RESULTS  -Lumbar Spine: L1-L4 (L3): BMD: 1.361 g/cm2. T-score: 1.4. Z-score: 3.2.  -RIGHT Hip Total: BMD: 0.868 g/cm2. T-score: -1.1. Z-score: 0.6.  -RIGHT Hip Femoral neck: BMD: 0.795 g/cm2. T-score: -1.8. Z-score: 0.2.  -LEFT Hip Total: BMD: 0.832 g/cm2. T-score: -1.4. Z-score: 0.3.  -LEFT Hip Femoral neck: BMD: 0.761 g/cm2. T-score: -2.0. Z-score: -0.1.    WHO T-SCORE CRITERIA  -Normal: T score at or above -1 SD  -Osteopenia: T score between -1 and -2.5 SD  -Osteoporosis: T score at or below -2.5 SD    The World Health Organization (WHO) criteria is applicable to perimenopausal females, postmenopausal females, and men aged 50 years or older.    INTERVAL CHANGE  -There has been a 0.9% increase in L1-L4 (L3) BMD.  -There has been a 1.2% decrease in bilateral hip BMD.    FRACTURE RISK  -FRAX Results: The 10 year probability of major osteoporotic fracture is 12.8%, and of hip fracture is 3.4%, based on left femoral neck BMD.    RECOMMENDATIONS  The current National Osteoporosis Foundation Guide recommends that FDA-approved medical therapies be considered if the 10 year probability of  major osteoporotic fracture risk is greater than or equal to 20%, or if the hip fracture risk is greater than or   equal to 3%. Please note all treatment decisions require clinical judgement and consideration of individual patient factors, including patient preferences, comorbidities, previous drug use, risk factors not captured in the FRAX model (e.g. frailty,   falls, vitamin D deficiency, increased bone turnover, interval significant decline in bone density) and possible under or over-estimation of fracture risk by FRAX.      Impression    IMPRESSION: Low bone density (OSTEOPENIA). T score meets the WHO criteria for low bone density (osteopenia) at one or more measured sites. The risk of osteoporotic fracture increases approximately two-fold for each standard deviation decrease in T-score.       If you have any questions or concerns, please call the clinic at the number listed above.       Sincerely,      ENRIQUETA Jackson CNP    Electronically signed

## 2025-01-29 LAB — BACTERIA UR CULT: NORMAL
